# Patient Record
Sex: FEMALE | NOT HISPANIC OR LATINO | Employment: UNEMPLOYED
[De-identification: names, ages, dates, MRNs, and addresses within clinical notes are randomized per-mention and may not be internally consistent; named-entity substitution may affect disease eponyms.]

---
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
## 2024-05-30 ENCOUNTER — OFFICE VISIT (OUTPATIENT)
Dept: URGENT CARE | Facility: URGENT CARE | Age: 24
End: 2024-05-30
Payer: COMMERCIAL

## 2024-05-30 VITALS
SYSTOLIC BLOOD PRESSURE: 125 MMHG | WEIGHT: 220 LBS | RESPIRATION RATE: 16 BRPM | DIASTOLIC BLOOD PRESSURE: 84 MMHG | OXYGEN SATURATION: 98 % | TEMPERATURE: 99.9 F | HEART RATE: 97 BPM

## 2024-05-30 DIAGNOSIS — R21 RASH: ICD-10-CM

## 2024-05-30 DIAGNOSIS — R07.0 THROAT PAIN: Primary | ICD-10-CM

## 2024-05-30 LAB
DEPRECATED S PYO AG THROAT QL EIA: NEGATIVE
FLUAV AG SPEC QL IA: NEGATIVE
FLUBV AG SPEC QL IA: NEGATIVE

## 2024-05-30 PROCEDURE — 87651 STREP A DNA AMP PROBE: CPT

## 2024-05-30 PROCEDURE — 87804 INFLUENZA ASSAY W/OPTIC: CPT

## 2024-05-30 PROCEDURE — 99203 OFFICE O/P NEW LOW 30 MIN: CPT

## 2024-05-30 RX ORDER — PREDNISONE 20 MG/1
20 TABLET ORAL DAILY
Qty: 5 TABLET | Refills: 0 | Status: SHIPPED | OUTPATIENT
Start: 2024-05-30 | End: 2024-06-04

## 2024-05-30 RX ORDER — TRIAMCINOLONE ACETONIDE 1 MG/G
OINTMENT TOPICAL 2 TIMES DAILY
Qty: 60 G | Refills: 0 | Status: SHIPPED | OUTPATIENT
Start: 2024-05-30

## 2024-05-30 RX ORDER — DIPHENHYDRAMINE HCL 50 MG
50 CAPSULE ORAL EVERY 6 HOURS PRN
Qty: 30 CAPSULE | Refills: 0 | Status: SHIPPED | OUTPATIENT
Start: 2024-05-30

## 2024-05-31 LAB — GROUP A STREP BY PCR: NOT DETECTED

## 2024-05-31 NOTE — PROGRESS NOTES
URGENT CARE  Assessment & Plan   Assessment:   Jeanne Gipson is a 23 year old female who's clinical presentation today is consistent with:   1. Throat pain  - Streptococcus A Rapid Screen w/Reflex to PCR - Clinic Collect  - Influenza A & B Antigen - Clinic Collect  Plan:  Will treat patient with supportive and symptomatic measures for pharyngitis at this time which include: Fluids, rest, over-the-counter medications; Educated patient that honey, warm fluids and gargling saltwater can also help  additionally tested for bacterial cause and rapid test was negative for streptococcal A; PCR test pending (updated pt results will come via mychart/call and rx will be reflexed if positive), additionally, educated patient to monitor their symptoms for improvement over the next week and to return for a follow up if the sore throat and/or tonsil swelling does not improve in the next 5-7 days.     No alarm signs or symptoms present   Differential Diagnoses for this patient's CC include: Bacterial vs viral etiology of pharyngitis, peritonsillar abscess, Tonsillitis, mono      2. Rash  - predniSONE (DELTASONE) 20 MG tablet;   - diphenhydrAMINE (BENADRYL) 50 MG capsule;   - triamcinolone (KENALOG) 0.1 % external ointment;   Plan:  Discussed with patient it is possibly a strep rash (or vial exanthem) given she also has pharyngitis and a fever today however rapid strep was negative, discussed with her we will get the PCR back tomorrow and we will know for sure at that time; however in the meantime we will treat as a dermatitis given she has pretty irritating pruritus, with:  antihistamines, steroid and a topical steroid cream.  Additionally we discussed if symptoms do not improve after starting today's treatment to follow up in 2-3  days, sooner if symptoms worsen, return precautions given  No alarm signs or symptoms present   Differential Diagnoses for this patient's chief complaint that I considered include:  Atopic vs allergic  vs contact dermatitis, cellulitis, Insect bite/sting, drug reaction, eczema, psoriasis, scabies, tinea, strep rash, viral exanthem: Erythema infectiosum, Rubella, rubeola, Enteroviral infection    Patient is} agreeable to treatment plan and state they will follow-up if symptoms do not improve and/or if symptoms worsen   see patient's AVS 'monitor for' section for specific patient instructions given and discussed regarding what to watch for and when to follow up    [facility]ANNY [facility]      ______________________________________________________________________      Subjective     HPI: Jeanne Gipson  is a 23 year old  female who presents today for evaluation the following concerns:   Patient endorses a sore throat today which started 3-4 days ago   Patient reports they have been experiencing a sore throat and fever, and now has a rash on face and spreading to body    Patient states the rash is small irritative bumps that are extensively itchy.  Patient states the rash and sore throat along with a fever all started at the same time.  Patient states she is not sure if it is 1 illness or to concurrent illnesses.  Patient denies any trigger or contact exposure that could be causing the rash, denies any changes to medications or food, denies any new detergents or clothing.    Review of Systems:  Pertinent review of systems as reflected in HPI, otherwise negative.     Objective    Physical Exam:  Vitals:    05/30/24 1934   BP: 125/84   Pulse: 97   Resp: 16   Temp: 99.9  F (37.7  C)   TempSrc: Tympanic   SpO2: 98%   Weight: 99.8 kg (220 lb)      General: Alert and oriented, no acute distress, Vital signs reviewed: fever noted,  normotensive  EYES: EOMs intact, PERRLA bilaterally   Conjunctiva: Clear bilaterally, no injection or erythema present  EARS: TMs intact, translucent gray in color with normal landmarks present no erythema  or bulging tympanic membrane   Canals are  without swelling, however have a mild amount of cerumen, no impaction  NOSE:  mucosa moist               No frontal or maxillary sinus tenderness present bilaterally  MOUTH/THROAT: lips, tongue, & oral mucosa appear normal upon inspection                Posterior oropharynx is erythematous but without exudate, lesions or tonsillar  Edema, no dysphonia, no unilateral tonsillar edema, no uvular deviation,   no signs of peritonsillar abscess  NECK: supple, has full range of motion with no meningeal signs              No lymphadenopathy present  LUNG: normal work of breathing, good respiratory effort without retractions, good air  movement, non labored, inspection reveals normal chest expansion w/  inspiration            Lung sounds are clear to auscultation bilaterally,            No rales/rhonic/crackles wheezing noted           No cough noted   SKIN:  Bilateral arms, legs and cheeks   Noted diffuse, slightly raised, maculopapular lesions that are erythematous and generalized   Lesions do not follow any specific distribution.    No  Vesicles bullae     no warmth noted, no edema, no pain to palpation.     No drainage noted, no signs of cellulitis type infection   No abscess seen    Intense pruritus noted    LABS:   Results for orders placed or performed in visit on 05/30/24   Streptococcus A Rapid Screen w/Reflex to PCR - Clinic Collect     Status: Normal    Specimen: Throat; Swab   Result Value Ref Range    Group A Strep antigen Negative Negative   Influenza A & B Antigen - Clinic Collect     Status: Normal    Specimen: Nose; Swab   Result Value Ref Range    Influenza A antigen Negative Negative    Influenza B antigen Negative Negative    Narrative    Test results must be correlated with clinical data. If necessary, results should be confirmed by a molecular assay or viral culture.      ______________________________________________________________________    I explained my diagnostic considerations and  recommendations to the patient, who voiced understanding and agreement with the treatment plan.   All questions were answered.   We discussed potential side effects, risks and benefits of any prescribed or recommended therapies, as well as expectations for response to treatments.  Please see AVS for any patient instructions & handouts given.   Patient was advised to contact the Nurse Care Line, their Primary Care provider, Urgent Care, or the Emergency Department if there are new or worsening symptoms, or call 911 for emergencies.

## 2024-05-31 NOTE — PATIENT INSTRUCTIONS
Diagnosis: contact dermatitis - skin rash   Plan:   Itch relief : steroid cream   Cool compress,   Benadryl   Oral steroids    Supportive   Keep rash open, do not cover, air helps to dry rash   Wear loose clothing   Avoid soaps, harsh chemicals   Prevention   Try to identify irritant and avoid   Follow up     Monitor for:   Fever of 101 F  Redness or swelling that gets worse  Pain that gets worse. Babies may show pain with fussiness that can't be soothed.  Bad-smelling fluid leaking from the skin  New rash on other parts of the body. This includes around the eyes, mouth, or genitals.  Difficulty breathing or shortness of breath         Contact Dermatitis:   Contact dermatitis is a skin rash caused by something that touches the skin and makes it irritated and inflamed.    skin may be red, swollen, dry, and cracked. Blisters may form and ooze. The rash will itch.  Contact dermatitis can form anywhere on body.   It can be caused from exposure to animals, soaps and detergents, plants, such as poison ivy, oak, or sumac.    Other common causes are metals such as jewelry or new skin creams   Contact dermatitis is not passed from person to person.      Long-term risks of topical corticosteroids:    - skin atrophy (thinning), telangiectasia, striae (stretch marks), glaucoma, adrenocortical suppression,   rebound flare, steroid withdrawal,    Do not apply to thin skin areas such as face or groin     Common triggers: harsh soaps, detergents,    Wool, abrasive fabrics, tight-fitting clothing ,    Chemicals: formaldehyde, airborne irrit    (smk, tobacco, air pollution)    And extreme transitions in temp, cold temperatures    Hot water